# Patient Record
Sex: FEMALE | Race: BLACK OR AFRICAN AMERICAN | Employment: OTHER | URBAN - METROPOLITAN AREA
[De-identification: names, ages, dates, MRNs, and addresses within clinical notes are randomized per-mention and may not be internally consistent; named-entity substitution may affect disease eponyms.]

---

## 2023-09-29 PROBLEM — K21.9 ESOPHAGEAL REFLUX: Status: ACTIVE | Noted: 2023-09-29

## 2023-09-29 PROBLEM — J45.909 ASTHMA (HHS-HCC): Status: ACTIVE | Noted: 2023-09-29

## 2023-09-29 PROBLEM — R42 VERTIGO: Status: ACTIVE | Noted: 2023-09-29

## 2023-09-29 PROBLEM — M19.041 PRIMARY OSTEOARTHRITIS OF RIGHT HAND: Status: ACTIVE | Noted: 2023-09-29

## 2023-09-29 PROBLEM — M17.9 OSTEOARTHRITIS OF KNEE: Status: ACTIVE | Noted: 2023-09-29

## 2023-09-29 PROBLEM — F32.A DEPRESSION: Status: ACTIVE | Noted: 2023-09-29

## 2023-09-29 PROBLEM — M79.7 FIBROMYALGIA: Status: ACTIVE | Noted: 2023-09-29

## 2023-09-29 PROBLEM — I20.9 ANGINA PECTORIS (CMS-HCC): Status: ACTIVE | Noted: 2023-09-29

## 2023-09-29 PROBLEM — R19.7 DIARRHEA: Status: ACTIVE | Noted: 2023-09-29

## 2023-09-29 PROBLEM — G56.20 LESION OF ULNAR NERVE: Status: ACTIVE | Noted: 2023-09-29

## 2023-09-29 PROBLEM — L30.9 DERMATITIS: Status: ACTIVE | Noted: 2023-09-29

## 2023-09-29 PROBLEM — I10 HYPERTENSION: Status: ACTIVE | Noted: 2023-09-29

## 2023-09-29 PROBLEM — M47.816 LUMBAR SPONDYLOSIS: Status: ACTIVE | Noted: 2023-09-29

## 2023-09-29 PROBLEM — M17.0 PRIMARY OSTEOARTHRITIS OF BOTH KNEES: Status: ACTIVE | Noted: 2023-09-29

## 2023-09-29 PROBLEM — I25.2 PAST MYOCARDIAL INFARCTION: Status: ACTIVE | Noted: 2023-09-29

## 2023-09-29 PROBLEM — G56.03 BILATERAL CARPAL TUNNEL SYNDROME: Status: ACTIVE | Noted: 2023-09-29

## 2023-09-29 PROBLEM — E11.9 DIABETES MELLITUS (MULTI): Status: ACTIVE | Noted: 2023-09-29

## 2023-09-29 PROBLEM — R25.2 MUSCLE CRAMPS: Status: ACTIVE | Noted: 2023-09-29

## 2023-09-29 PROBLEM — I25.10 CORONARY ARTERY DISEASE: Status: ACTIVE | Noted: 2023-09-29

## 2023-09-29 RX ORDER — ALBUTEROL SULFATE 0.83 MG/ML
SOLUTION RESPIRATORY (INHALATION)
COMMUNITY

## 2023-09-29 RX ORDER — DESONIDE 0.5 MG/G
CREAM TOPICAL 2 TIMES DAILY
COMMUNITY

## 2023-09-29 RX ORDER — NYSTATIN 100000 [USP'U]/ML
1 SUSPENSION ORAL 4 TIMES DAILY
COMMUNITY

## 2023-09-29 RX ORDER — SOLIFENACIN SUCCINATE 10 MG/1
10 TABLET, FILM COATED ORAL DAILY
COMMUNITY

## 2023-09-29 RX ORDER — DEXAMETHASONE SODIUM PHOSPHATE 1 MG/ML
4 SOLUTION/ DROPS OPHTHALMIC 2 TIMES DAILY
COMMUNITY

## 2023-09-29 RX ORDER — LIDOCAINE 50 MG/G
1 PATCH TOPICAL
COMMUNITY
Start: 2014-04-29 | End: 2024-03-04 | Stop reason: SDUPTHER

## 2023-09-29 RX ORDER — POTASSIUM CITRATE 10 MEQ/1
10 TABLET, EXTENDED RELEASE ORAL 2 TIMES DAILY
COMMUNITY

## 2023-09-29 RX ORDER — LATANOPROST 50 UG/ML
1 SOLUTION/ DROPS OPHTHALMIC NIGHTLY
COMMUNITY
End: 2024-02-03 | Stop reason: SDUPTHER

## 2023-09-29 RX ORDER — GLIPIZIDE 2.5 MG/1
1 TABLET, EXTENDED RELEASE ORAL 2 TIMES DAILY
COMMUNITY
Start: 2016-02-08

## 2023-09-29 RX ORDER — VENLAFAXINE HYDROCHLORIDE 150 MG/1
150 CAPSULE, EXTENDED RELEASE ORAL DAILY
COMMUNITY
Start: 2023-08-10

## 2023-09-29 RX ORDER — ESOMEPRAZOLE MAGNESIUM 40 MG/1
1 CAPSULE, DELAYED RELEASE ORAL DAILY
COMMUNITY
Start: 2013-11-04

## 2023-09-29 RX ORDER — SUCRALFATE 1 G/10ML
1 SUSPENSION ORAL
COMMUNITY

## 2023-09-29 RX ORDER — ONDANSETRON 4 MG/1
4 TABLET, ORALLY DISINTEGRATING ORAL EVERY 6 HOURS PRN
COMMUNITY

## 2023-09-29 RX ORDER — ASPIRIN 325 MG
50000 TABLET, DELAYED RELEASE (ENTERIC COATED) ORAL EVERY OTHER DAY
COMMUNITY

## 2023-09-29 RX ORDER — SUCRALFATE 1 G/1
1 TABLET ORAL
COMMUNITY

## 2023-09-29 RX ORDER — CARVEDILOL 3.12 MG/1
TABLET ORAL
COMMUNITY
Start: 2014-04-29

## 2023-09-29 RX ORDER — INSULIN GLARGINE 100 [IU]/ML
15 INJECTION, SOLUTION SUBCUTANEOUS EVERY MORNING
COMMUNITY
Start: 2021-11-22

## 2023-09-29 RX ORDER — GLIMEPIRIDE 2 MG/1
2 TABLET ORAL 2 TIMES DAILY
COMMUNITY

## 2023-09-29 RX ORDER — ATORVASTATIN CALCIUM 80 MG/1
1 TABLET, FILM COATED ORAL DAILY
COMMUNITY
Start: 2015-11-23

## 2023-09-29 RX ORDER — TRAZODONE HYDROCHLORIDE 150 MG/1
150 TABLET ORAL NIGHTLY
COMMUNITY

## 2023-09-29 RX ORDER — ROSUVASTATIN CALCIUM 10 MG/1
1 TABLET, COATED ORAL NIGHTLY
COMMUNITY
Start: 2021-12-01

## 2023-09-29 RX ORDER — DIAZEPAM 2 MG/1
2 TABLET ORAL 2 TIMES DAILY PRN
COMMUNITY
Start: 2015-09-15

## 2023-09-29 RX ORDER — LORATADINE 10 MG/1
CAPSULE, LIQUID FILLED ORAL
COMMUNITY

## 2023-09-29 RX ORDER — BUSPIRONE HYDROCHLORIDE 7.5 MG/1
1 TABLET ORAL 2 TIMES DAILY
COMMUNITY
Start: 2022-05-05

## 2023-09-29 RX ORDER — ASCORBIC ACID 500 MG
1 TABLET ORAL DAILY
COMMUNITY

## 2023-09-29 RX ORDER — ROSUVASTATIN CALCIUM 20 MG/1
1 TABLET, COATED ORAL NIGHTLY
COMMUNITY
Start: 2023-07-20 | End: 2023-10-18

## 2023-09-29 RX ORDER — FLUOCINONIDE 0.5 MG/G
1 CREAM TOPICAL 2 TIMES DAILY PRN
COMMUNITY

## 2023-09-29 RX ORDER — HYDRALAZINE HYDROCHLORIDE 25 MG/1
25 TABLET, FILM COATED ORAL 2 TIMES DAILY
COMMUNITY
Start: 2023-05-08

## 2023-09-29 RX ORDER — CALCIUM CARBONATE/VITAMIN D3 500-10/5ML
1 LIQUID (ML) ORAL DAILY
COMMUNITY

## 2023-09-29 RX ORDER — INSULIN LISPRO 100 [IU]/ML
1 INJECTION, SOLUTION INTRAVENOUS; SUBCUTANEOUS
COMMUNITY

## 2023-09-29 RX ORDER — UMECLIDINIUM 62.5 UG/1
1 AEROSOL, POWDER ORAL DAILY
COMMUNITY
Start: 2023-04-21

## 2023-09-29 RX ORDER — AMLODIPINE BESYLATE 5 MG/1
5 TABLET ORAL DAILY
COMMUNITY
Start: 2023-07-20 | End: 2023-10-18

## 2023-09-29 RX ORDER — BLOOD SUGAR DIAGNOSTIC
STRIP MISCELLANEOUS
COMMUNITY
Start: 2023-07-20

## 2023-09-29 RX ORDER — LAMOTRIGINE 200 MG/1
200 TABLET ORAL DAILY
COMMUNITY
Start: 2023-05-03

## 2023-09-29 RX ORDER — DULOXETIN HYDROCHLORIDE 60 MG/1
1 CAPSULE, DELAYED RELEASE ORAL DAILY
COMMUNITY
Start: 2021-07-14

## 2023-09-29 RX ORDER — VALACYCLOVIR HYDROCHLORIDE 500 MG/1
1 TABLET, FILM COATED ORAL DAILY
COMMUNITY
Start: 2021-11-28

## 2023-09-29 RX ORDER — OXYCODONE AND ACETAMINOPHEN 5; 325 MG/1; MG/1
1 TABLET ORAL EVERY 4 HOURS PRN
COMMUNITY

## 2023-09-29 RX ORDER — HYDROCORTISONE 25 MG/G
1 CREAM TOPICAL 2 TIMES DAILY
COMMUNITY

## 2023-09-29 RX ORDER — ZINC GLUCONATE 50 MG
50 TABLET ORAL DAILY
COMMUNITY

## 2023-09-29 RX ORDER — LORATADINE 10 MG/1
10 TABLET ORAL DAILY
COMMUNITY
Start: 2023-08-09

## 2023-09-29 RX ORDER — BUDESONIDE AND FORMOTEROL FUMARATE DIHYDRATE 160; 4.5 UG/1; UG/1
2 AEROSOL RESPIRATORY (INHALATION) 2 TIMES DAILY
COMMUNITY

## 2023-09-29 RX ORDER — QUETIAPINE FUMARATE 100 MG/1
1 TABLET, FILM COATED ORAL NIGHTLY
COMMUNITY
Start: 2023-08-10 | End: 2024-02-06

## 2023-09-29 RX ORDER — TALC
250 POWDER (GRAM) TOPICAL
COMMUNITY
Start: 2022-08-26

## 2023-09-29 RX ORDER — PYRIDOXINE HCL (VITAMIN B6) 100 MG
100 TABLET ORAL DAILY
COMMUNITY

## 2023-09-29 RX ORDER — DOXYCYCLINE 100 MG/1
1 CAPSULE ORAL DAILY
COMMUNITY
Start: 2022-12-05

## 2023-09-29 RX ORDER — GABAPENTIN 400 MG/1
1 CAPSULE ORAL 2 TIMES DAILY
COMMUNITY
Start: 2014-04-29

## 2023-09-29 RX ORDER — HYDROCODONE BITARTRATE AND ACETAMINOPHEN 5; 325 MG/1; MG/1
1 TABLET ORAL EVERY 6 HOURS PRN
COMMUNITY
Start: 2014-12-07

## 2023-09-29 RX ORDER — NITROGLYCERIN 400 UG/1
1 SPRAY ORAL EVERY 5 MIN PRN
COMMUNITY

## 2023-09-29 RX ORDER — BUPROPION HYDROCHLORIDE 150 MG/1
150 TABLET, EXTENDED RELEASE ORAL 2 TIMES DAILY
COMMUNITY
Start: 2023-05-03

## 2023-09-29 RX ORDER — ASCORBATE CALCIUM 500 MG
1 TABLET ORAL DAILY
COMMUNITY

## 2023-09-29 RX ORDER — PREGABALIN 75 MG/1
75 CAPSULE ORAL 3 TIMES DAILY PRN
COMMUNITY

## 2023-09-29 RX ORDER — ASPIRIN 81 MG/1
81 TABLET ORAL DAILY
COMMUNITY

## 2023-09-29 RX ORDER — BENZONATATE 200 MG/1
200 CAPSULE ORAL 3 TIMES DAILY PRN
COMMUNITY
Start: 2023-04-21

## 2023-09-29 RX ORDER — NYSTATIN 100000 [USP'U]/G
POWDER TOPICAL 2 TIMES DAILY
COMMUNITY
Start: 2018-09-10

## 2023-09-29 RX ORDER — DULOXETIN HYDROCHLORIDE 30 MG/1
30 CAPSULE, DELAYED RELEASE ORAL DAILY
COMMUNITY

## 2023-09-29 RX ORDER — NIFEDIPINE 90 MG/1
TABLET, EXTENDED RELEASE ORAL
COMMUNITY

## 2023-09-29 RX ORDER — ERGOCALCIFEROL 1.25 MG/1
50000 CAPSULE ORAL
COMMUNITY

## 2023-09-29 RX ORDER — FLUTICASONE PROPIONATE AND SALMETEROL 250; 50 UG/1; UG/1
1 POWDER RESPIRATORY (INHALATION)
COMMUNITY

## 2023-09-29 RX ORDER — MONTELUKAST SODIUM 10 MG/1
10 TABLET ORAL DAILY
COMMUNITY

## 2023-09-29 RX ORDER — ISOSORBIDE MONONITRATE 60 MG/1
60 TABLET, EXTENDED RELEASE ORAL DAILY
COMMUNITY
Start: 2023-06-12

## 2023-09-29 RX ORDER — FERROUS SULFATE 325(65) MG
1 TABLET ORAL DAILY
COMMUNITY

## 2023-09-29 RX ORDER — VIBEGRON 75 MG/1
75 TABLET, FILM COATED ORAL
COMMUNITY

## 2023-10-03 ENCOUNTER — APPOINTMENT (OUTPATIENT)
Dept: OPHTHALMOLOGY | Facility: CLINIC | Age: 73
End: 2023-10-03
Payer: MEDICARE

## 2023-12-16 ENCOUNTER — APPOINTMENT (OUTPATIENT)
Dept: OPHTHALMOLOGY | Facility: CLINIC | Age: 73
End: 2023-12-16
Payer: MEDICARE

## 2024-02-03 ENCOUNTER — OFFICE VISIT (OUTPATIENT)
Dept: OPHTHALMOLOGY | Facility: CLINIC | Age: 74
End: 2024-02-03
Payer: MEDICARE

## 2024-02-03 DIAGNOSIS — H53.40 UNSPECIFIED VISUAL FIELD DEFECTS: ICD-10-CM

## 2024-02-03 DIAGNOSIS — Z01.00 EXAMINATION OF EYES AND VISION: Primary | ICD-10-CM

## 2024-02-03 DIAGNOSIS — H53.9 UNSPECIFIED VISUAL DISTURBANCE: ICD-10-CM

## 2024-02-03 PROCEDURE — 99212 OFFICE O/P EST SF 10 MIN: CPT | Performed by: OPHTHALMOLOGY

## 2024-02-03 PROCEDURE — 92083 EXTENDED VISUAL FIELD XM: CPT | Performed by: OPHTHALMOLOGY

## 2024-02-03 PROCEDURE — 92133 CPTRZD OPH DX IMG PST SGM ON: CPT | Performed by: OPHTHALMOLOGY

## 2024-02-03 RX ORDER — LATANOPROST 50 UG/ML
1 SOLUTION/ DROPS OPHTHALMIC NIGHTLY
Qty: 2.5 ML | Refills: 11 | Status: SHIPPED | OUTPATIENT
Start: 2024-02-03

## 2024-02-03 ASSESSMENT — TONOMETRY
IOP_METHOD: GOLDMANN APPLANATION
OS_IOP_MMHG: 19
OD_IOP_MMHG: 18

## 2024-02-03 ASSESSMENT — EXTERNAL EXAM - LEFT EYE: OS_EXAM: NORMAL

## 2024-02-03 ASSESSMENT — SLIT LAMP EXAM - LIDS
COMMENTS: NORMAL
COMMENTS: NORMAL

## 2024-02-03 ASSESSMENT — PACHYMETRY
OD_CT(UM): 530
OS_CT(UM): 537

## 2024-02-03 ASSESSMENT — CUP TO DISC RATIO
OS_RATIO: 0.45
OD_RATIO: 0.6

## 2024-02-03 ASSESSMENT — ENCOUNTER SYMPTOMS: EYES NEGATIVE: 1

## 2024-02-03 ASSESSMENT — VISUAL ACUITY
OD_SC+: -2
OD_SC: 20/20
METHOD: SNELLEN - LINEAR
OS_SC: 20/30

## 2024-02-03 ASSESSMENT — GONIOSCOPY
OD_SUPERIOR: 3/360
OS_SUPERIOR: 3/360

## 2024-02-03 ASSESSMENT — EXTERNAL EXAM - RIGHT EYE: OD_EXAM: NORMAL

## 2024-02-05 ENCOUNTER — APPOINTMENT (OUTPATIENT)
Dept: RHEUMATOLOGY | Facility: CLINIC | Age: 74
End: 2024-02-05
Payer: MEDICARE

## 2024-03-04 ENCOUNTER — OFFICE VISIT (OUTPATIENT)
Dept: RHEUMATOLOGY | Facility: CLINIC | Age: 74
End: 2024-03-04
Payer: MEDICARE

## 2024-03-04 VITALS
SYSTOLIC BLOOD PRESSURE: 156 MMHG | HEIGHT: 62 IN | BODY MASS INDEX: 26.5 KG/M2 | TEMPERATURE: 98.1 F | DIASTOLIC BLOOD PRESSURE: 78 MMHG | WEIGHT: 144 LBS | HEART RATE: 62 BPM

## 2024-03-04 DIAGNOSIS — R27.0 ATAXIA: Primary | ICD-10-CM

## 2024-03-04 DIAGNOSIS — M15.9 OSTEOARTHRITIS, GENERALIZED: ICD-10-CM

## 2024-03-04 DIAGNOSIS — M47.816 OSTEOARTHRITIS OF FACET JOINT OF LUMBAR SPINE: Primary | ICD-10-CM

## 2024-03-04 PROCEDURE — 99214 OFFICE O/P EST MOD 30 MIN: CPT | Performed by: INTERNAL MEDICINE

## 2024-03-04 PROCEDURE — 3078F DIAST BP <80 MM HG: CPT | Performed by: INTERNAL MEDICINE

## 2024-03-04 PROCEDURE — 1159F MED LIST DOCD IN RCRD: CPT | Performed by: INTERNAL MEDICINE

## 2024-03-04 PROCEDURE — 1036F TOBACCO NON-USER: CPT | Performed by: INTERNAL MEDICINE

## 2024-03-04 PROCEDURE — 3077F SYST BP >= 140 MM HG: CPT | Performed by: INTERNAL MEDICINE

## 2024-03-04 PROCEDURE — 1125F AMNT PAIN NOTED PAIN PRSNT: CPT | Performed by: INTERNAL MEDICINE

## 2024-03-04 RX ORDER — LIDOCAINE 50 MG/G
3 PATCH TOPICAL DAILY
Qty: 270 PATCH | Refills: 4 | Status: SHIPPED | OUTPATIENT
Start: 2024-03-04 | End: 2025-03-04

## 2024-03-04 ASSESSMENT — PAIN SCALES - GENERAL: PAINLEVEL: 8

## 2024-03-04 NOTE — PROGRESS NOTES
She was recently discharged from the hospital after being admitted for balance problems with near syncope with history of a head injury secondary to falling.  She has right-sided weakness.  She underwent evaluation for stroke, epilepsy, and neurological etiologies of the near syncope and problems with balance.  There is no obvious etiology for her balance problem.  She has history of having fallen out of bed and abdomen difficulty with ambulation recurrent falling.  She uses a walker or rollator to ambulate.  She is currently having physical therapy and home for improvement in mobility.  She notes that the episodes of falling tends to occur spontaneously and are associated with dizziness.  She continues to note pain in her lower back and knees.  She has been using 3 lidocaine patches on her lower back.  She is following with neurology regarding spontaneous urination.  She is being considered for Botox injections to the bladder.  She also notes intermittent tremor in her upper extremities.    She has a thin body habitus with wasting about the face.  The lungs are clear to auscultation.  The heart has a regular rate and rhythm.  The abdomen is benign.  The extremities are without edema.  The neurological examination shows muscle strength to be 4-5/5 proximal and distal aspects of the extremities, 5/5 in the distal aspect of the lower extremities, and 4/5 in the right hip flexor and 4-5/5 in the all left hip flexor.  She has marked ataxia on Romberg testing.  The musculoskeletal examination does not show any joint effusion.  There is straightening of the lumbar doses.    MRI cervical spine (2/27/2024) mild spinal canal stenosis at C4/C5 secondary to disc osteophyte complex flattening from the ventral thecal sac.  There is moderate left and mild to moderate right neuroforaminal stenosis.  There is mild to moderate spinal canal stenosis at C5/C6 secondary to disc osteophyte complex with marginal flattening ventral spinal  cord.  There is mild spinal canal stenosis secondary to disc osteophyte complex at C6/C7.  There is moderate bilateral neuroforaminal narrowing.  X-ray right shoulder (1/15/2024) degenerative spurring at the superior and inferior aspects of the glenoid.  CT scan cervical spine (1/15/2024) reversal of the normal normal cervical lordosis.  There is mild multilevel degenerative disc disease with disc height narrowing.  Head CT scan (1/15/2024) patchy foci of low-attenuation in the supratentorial white matter.  There is mild diffuse brain parenchymal volume loss.  EEG (1/16/2024) normal.  2D echocardiogram (1/18/2024) mild concentric left ventricular hypertrophy.  Left ventricular ejection fraction 60± percent, there is trace mitral tricuspid and pulmonic regurgitations.  There is no pericardial effusion.  X-ray hips (1/18/2024) mild to moderate degenerative changes of both hips.  Sacroiliac joint are maintained with small osteophytes.  MRI scan lumbar spine (1/19/2024) multiple bilateral renal cysts.  No there is facet degenerative changes at L5/S1 with endplate degenerative changes of L5/S1.  There is a small central disc protrusion at T11/T12, L1/L2, and 2/23 and mild central canal stenosis at L3/L4.  There is moderate central canal stenosis at L4/L5 and moderate to severe spinal canal stenosis at L5/S1.  Esophagogastroduodenoscopy (12/15/2023) normal oropharynx esophagus and jejunum.  There is Flory-en-Y gastrojejunostomy with gastrojejunal anastomosis.    She has history of fibromyalgia, generalized osteoarthritis, renal insufficiency, osteopenia, type 2 diabetes mellitus, obstructive sleep apnea, GERD, status post Flory-en-Y gastric bypass surgery, status post left total shoulder arthroplasty, status post bilateral knee surgery for meniscus tears, coronary artery disease status postcoronary artery stent placement.  She has significant ataxia with recurrent falling    She is to continue with lidocaine patches to  lower back as needed for pain.  Will not refill gabapentin at this time because she is currently taking Lyrica 50 mg capsules and because of history of problems with balance.  She is to continue follow-up with neurology, urology, and endocrinology.  She is to return at the next available office

## 2024-07-22 ENCOUNTER — APPOINTMENT (OUTPATIENT)
Dept: RHEUMATOLOGY | Facility: CLINIC | Age: 74
End: 2024-07-22
Payer: MEDICARE

## 2024-07-22 VITALS
WEIGHT: 143 LBS | TEMPERATURE: 97.7 F | HEIGHT: 62 IN | DIASTOLIC BLOOD PRESSURE: 81 MMHG | BODY MASS INDEX: 26.31 KG/M2 | HEART RATE: 67 BPM | OXYGEN SATURATION: 96 % | SYSTOLIC BLOOD PRESSURE: 160 MMHG

## 2024-07-22 DIAGNOSIS — M15.9 OSTEOARTHRITIS, GENERALIZED: Primary | ICD-10-CM

## 2024-07-22 PROCEDURE — 1159F MED LIST DOCD IN RCRD: CPT | Performed by: INTERNAL MEDICINE

## 2024-07-22 PROCEDURE — 1036F TOBACCO NON-USER: CPT | Performed by: INTERNAL MEDICINE

## 2024-07-22 PROCEDURE — 3077F SYST BP >= 140 MM HG: CPT | Performed by: INTERNAL MEDICINE

## 2024-07-22 PROCEDURE — 99214 OFFICE O/P EST MOD 30 MIN: CPT | Performed by: INTERNAL MEDICINE

## 2024-07-22 PROCEDURE — 3079F DIAST BP 80-89 MM HG: CPT | Performed by: INTERNAL MEDICINE

## 2024-07-22 NOTE — PROGRESS NOTES
She presents for follow-up evaluation with complaints of recurrent falling has been more frequent and more serious to the point that she had noted loss of consciousness following one of the falls.  She had laceration on the left side of her forehead from another fall.  She notes that the falls are sometimes preceded by headache and sometimes associated with blurred vision.  She has had ear nose and throat evaluation in the past that apparently did not find any evidence of inner ear disease.  She has attended balance physical therapy in the past.  She is planning to restart balance physical therapy.  She has taken meclizine without improvement in the balance problems.  She has seen neurology and is planning to follow-up with neurology after starting balance physical therapy.  She has noted a tendency to fall in different directions at different times.  She has fallen climbing stairs.  She has fallen getting out of bed.  She has noted unsteadiness even with using a walker to ambulate.  She was hospitalized on 2 occasions for right back and abdominal pain and found to have bilateral kidney stones for which she underwent ultrasonic lithotripsy on the left and cystoscopy with G-tube stent placement for right-sided kidney stone.  She is readmitted with recurrence of the flank pain and found to have a kidney stone adjacent to the right ureteral stent.  She is currently wearing a heart monitor to assess for any cardiac arrhythmias that may be contributing to the recurrent falling.    Examination shows a thin body habitus.  There is slightly limited abduction of the left shoulder status post left shoulder replacement surgery.  Muscle strength is 4/5 in the left shoulder abductor, 5/5 in the right shoulder abductor, 4/5 in the right hip flexor and 3-4/5 in the left hip flexor.  Is 5/5 in the right knee flexor and right knee extensor and 4/5 on the left knee flexors and extensors.  She has ataxia on Romberg testing.  She has  slight apraxia on finger-nose testing bilaterally and slightly worse with seeing out of the left eye relative to the right.  She is currently wearing a heart monitor    MRI cervical spine (2/27/2024) mild spinal canal stenosis at C4/C5 and C6/C7 and mild to moderate spinal canal stenosis at C5/C6.  MRI brain (1/23/2024) nonspecific small foci of high T2 and FLAIR signal in the white matter.  Age-appropriate generalized parenchymal volume loss.  Small developmental venous anomaly in the right posterior fossa.  CT scan brain (5/26/2024) small osteoma on the inner table of the right frontal bone without mass effect.  There is scattered patchy nonspecific white matter hyperintensities.  Evidence of remote right basal ganglion lacunar infarct.  Generalized parenchymal volume loss.  Ultrasound of the right lower extremity (5/30/2024) no evidence of deep vein thrombosis.  X-ray right ankle (5/30/2024) faint density along the anterior to posterior aspect of the tibia joint consistent with joint effusion.  There is a plantar calcaneal osteophyte.  CT scan abdomen and pelvis (7/3/2024) 3 to 4 mm calculus in the distal right ureter adjacent to the J stent.  There is bilateral renal cysts, prior gastric bypass surgery, colonic diverticulosis.  Retrograde pyelogram (7/5/2024) removal of the double-J ureter right ureteral stent.    She has ataxia with recurrent falling with mild left lower extremity weakness relative to the right lower extremity.  She has head CT scan suggesting old lacunar right basal ganglion infarct and MRI of the cervical spine showing mild to moderate spinal canal stenosis.  Suspect neurogenic etiology of the recurrent falling and ataxia.    She is to continue plans for neurology evaluation and completion of the cardiac arrhythmia monitoring.  No new medications were prescribed.  She is to return at the next available office appointment.

## 2024-08-20 ENCOUNTER — APPOINTMENT (OUTPATIENT)
Dept: OPHTHALMOLOGY | Facility: CLINIC | Age: 74
End: 2024-08-20
Payer: MEDICARE

## 2025-01-20 ENCOUNTER — APPOINTMENT (OUTPATIENT)
Dept: RHEUMATOLOGY | Facility: CLINIC | Age: 75
End: 2025-01-20
Payer: MEDICARE

## 2025-01-20 VITALS
HEART RATE: 73 BPM | TEMPERATURE: 97.2 F | WEIGHT: 147.4 LBS | DIASTOLIC BLOOD PRESSURE: 77 MMHG | SYSTOLIC BLOOD PRESSURE: 125 MMHG | BODY MASS INDEX: 27.12 KG/M2 | HEIGHT: 62 IN | OXYGEN SATURATION: 98 %

## 2025-01-20 DIAGNOSIS — B01.9 VARICELLA WITHOUT COMPLICATION: ICD-10-CM

## 2025-01-20 DIAGNOSIS — R27.0 ATAXIA: ICD-10-CM

## 2025-01-20 DIAGNOSIS — M15.9 OSTEOARTHRITIS, GENERALIZED: Primary | ICD-10-CM

## 2025-01-20 PROCEDURE — 1160F RVW MEDS BY RX/DR IN RCRD: CPT | Performed by: INTERNAL MEDICINE

## 2025-01-20 PROCEDURE — 1159F MED LIST DOCD IN RCRD: CPT | Performed by: INTERNAL MEDICINE

## 2025-01-20 PROCEDURE — 3078F DIAST BP <80 MM HG: CPT | Performed by: INTERNAL MEDICINE

## 2025-01-20 PROCEDURE — 1125F AMNT PAIN NOTED PAIN PRSNT: CPT | Performed by: INTERNAL MEDICINE

## 2025-01-20 PROCEDURE — 99214 OFFICE O/P EST MOD 30 MIN: CPT | Performed by: INTERNAL MEDICINE

## 2025-01-20 PROCEDURE — 1036F TOBACCO NON-USER: CPT | Performed by: INTERNAL MEDICINE

## 2025-01-20 PROCEDURE — 3074F SYST BP LT 130 MM HG: CPT | Performed by: INTERNAL MEDICINE

## 2025-01-20 RX ORDER — VALACYCLOVIR HYDROCHLORIDE 500 MG/1
500 TABLET, FILM COATED ORAL DAILY
Qty: 90 TABLET | Refills: 3 | Status: SHIPPED | OUTPATIENT
Start: 2025-01-20 | End: 2026-01-20

## 2025-01-20 RX ORDER — GABAPENTIN 400 MG/1
400 CAPSULE ORAL 2 TIMES DAILY
Qty: 180 CAPSULE | Refills: 3 | Status: SHIPPED | OUTPATIENT
Start: 2025-01-20 | End: 2026-01-20

## 2025-01-20 ASSESSMENT — PAIN SCALES - GENERAL: PAINLEVEL_OUTOF10: 7

## 2025-01-20 NOTE — PROGRESS NOTES
She continues to have problems with balance with intermittent spontaneous falling without any warning.  She has been hospitalized on multiple occasions because of the recurrent falling with trauma.  She has not had any fractures.  She is planning to have Botox injections because of urinary incontinence.  She notes pain in her knees, MCP joint of the third digit of the right hand, and left side of her neck,and shoulders.  She uses a walker to ambulate.  She has been using Restasis eyedrops and moisturizing eyedrops because of the eye dryness.    Examination shows a thin body habitus.  She has standing history in the lower face bilaterally.  There is pain in the left posterior lateral neck with leftward rotation of the neck against resistance.  There is tenderness over the extensor aspect of the right third MCP joint otherwise preserved range of motion of the digits of both hands and both wrists.  The knees and ankles are not swollen.  There is normal muscle strength in the shoulder abductors bilaterally.  Muscle strength is 4-5/5 in the right hip flexor and right knee flexors and right knee extensors against resistance.  Muscle strength is 5/5 in the left lower extremity.  She has mild ataxia on tandem gait while using a walker.    Laboratory (7/6/2024) WBC 5.19, hemoglobin 9.6, hematocrit 28.8, MCV 92.6, MCHC 33.3, platelets 235, BUN 9, creatinine 0.95, glucose 61, sodium 142, potassium 4.2, chloride 107, bicarbonate 25.    She has history of recurrent falling with ataxia, slight right lower extremity weakness relative to the left lower extremity, history of a old lacunar right basal ganglia infarct, mild-moderate  cervical spinal canal stenosis, nephrolithiasis, exocrine pancreatic insufficiency, asthma, migraine headaches, hypertension, depression, obstructive sleep apnea, GERD, type 2 diabetes mellitus, coronary artery disease status post stent placement, diverticulosis, status post gastric bypass surgery, status  post left shoulder replacement.  No clear-cut etiology for the spontaneous ataxia and spontaneous falling.  She is planning to have Botox injections of the bladder.    She is to have laboratory for CRP, anti-CCP, OSKAR panel.  She is to continue with her current medications gabapentin 400 mg twice daily, lidocaine 5% patch topically to the shoulders as needed for pain, valacyclovir 500 mg once per day.  She is to return at the next available office appointment.

## 2025-01-27 ENCOUNTER — LAB (OUTPATIENT)
Dept: LAB | Facility: HOSPITAL | Age: 75
End: 2025-01-27
Payer: MEDICARE

## 2025-01-31 LAB
CCP IGG SERPL-ACNC: <16 UNITS
CRP SERPL-MCNC: <3 MG/L
CRYOGLOB SER QL: NORMAL
RHEUMATOID FACT SERPL-ACNC: <10 IU/ML

## 2025-07-28 ENCOUNTER — APPOINTMENT (OUTPATIENT)
Dept: RHEUMATOLOGY | Facility: CLINIC | Age: 75
End: 2025-07-28
Payer: MEDICARE

## 2025-07-28 VITALS
SYSTOLIC BLOOD PRESSURE: 130 MMHG | BODY MASS INDEX: 27.25 KG/M2 | HEART RATE: 66 BPM | DIASTOLIC BLOOD PRESSURE: 75 MMHG | WEIGHT: 149 LBS | OXYGEN SATURATION: 97 %

## 2025-07-28 DIAGNOSIS — R27.0 ATAXIA: ICD-10-CM

## 2025-07-28 DIAGNOSIS — M15.9 OSTEOARTHRITIS, GENERALIZED: ICD-10-CM

## 2025-07-28 DIAGNOSIS — R41.3 MEMORY DISTURBANCE: Primary | ICD-10-CM

## 2025-07-28 PROCEDURE — 3075F SYST BP GE 130 - 139MM HG: CPT | Performed by: INTERNAL MEDICINE

## 2025-07-28 PROCEDURE — 3078F DIAST BP <80 MM HG: CPT | Performed by: INTERNAL MEDICINE

## 2025-07-28 PROCEDURE — 99214 OFFICE O/P EST MOD 30 MIN: CPT | Performed by: INTERNAL MEDICINE

## 2025-07-28 PROCEDURE — 1159F MED LIST DOCD IN RCRD: CPT | Performed by: INTERNAL MEDICINE

## 2025-07-28 RX ORDER — GABAPENTIN 400 MG/1
400 CAPSULE ORAL 2 TIMES DAILY
Qty: 180 CAPSULE | Refills: 3 | Status: SHIPPED | OUTPATIENT
Start: 2025-07-28 | End: 2026-07-28

## 2025-07-28 RX ORDER — LIDOCAINE 50 MG/G
1 PATCH TOPICAL DAILY
Qty: 90 PATCH | Refills: 3 | Status: SHIPPED | OUTPATIENT
Start: 2025-07-28 | End: 2026-07-28

## 2025-07-28 NOTE — PROGRESS NOTES
She presents for follow-up evaluation with complaints of continued headaches.  She has been treated for macular degeneration but notes that intraocular injections seem to provide improvement in vision temporarily and tend to wear off prior to the next injection.  She continues to note problems with balance with tendency to fall.  She has not had any fractures.  She had surgery on the left second toe (6/9/2025) for benign bone cyst.  However, postoperative, she noted generalized pruritus.  The itching did not resolve after Decadron, Benadryl.  She also notes symptoms of worsening memory disturbance.    She has a thin body habitus.  She is alert.  The lungs, heart, and abdomen, and extremities are benign except for hyperpigmentation and slight swelling of the left second toe.  The musculoskeletal examination shows crepitus on range of motion of the left shoulder.  There is preserved passive range of motion of the right shoulder.  The knees and ankles are not swollen.  She has slight apraxia on Romberg testing and on tandem gait.  Muscle strength is 4-5/5 in the left hip flexor 5/5 in the right hip flexor.    Laboratory (1/27/2025) CRP <3.0, CCP <16, RF <10, (1/20/2025) OSKAR/STEPHANIE panel negative.    Head CT scan (4/5/2024) mild low-attenuation of the central white matter.  Mild generalized volume loss.  No evidence of acute intracranial hemorrhage mass effect or extra-axial fluid collection.  No evidence of acute infarct.    She has history of recurrent falls, ataxia, remote lacunar right basal ganglia stroke, mild to moderate cervical spinal canal stenosis, nephrolithiasis, pancreatic insufficiency, asthma, migraine headaches, hypertension, type 2 diabetes mellitus, diverticulosis, status post gastric bypass surgery, status post left shoulder replacement.  She has memory disturbance possibly related to cerebrovascular disease, sedating medications, anesthesia, diabetes mellitus.    She is referred for neuropsychiatric  testing to assess etiology memory disturbance.  She is otherwise continue with her current medications and return for follow-up evaluation in 6 months.

## 2026-02-23 ENCOUNTER — APPOINTMENT (OUTPATIENT)
Dept: RHEUMATOLOGY | Facility: CLINIC | Age: 76
End: 2026-02-23
Payer: MEDICARE